# Patient Record
Sex: MALE | URBAN - METROPOLITAN AREA
[De-identification: names, ages, dates, MRNs, and addresses within clinical notes are randomized per-mention and may not be internally consistent; named-entity substitution may affect disease eponyms.]

---

## 2019-10-15 ENCOUNTER — OFFICE VISIT (OUTPATIENT)
Dept: URBAN - METROPOLITAN AREA CLINIC 22 | Facility: CLINIC | Age: 44
End: 2019-10-15
Payer: COMMERCIAL

## 2019-10-15 DIAGNOSIS — E11.9 TYPE 2 DIABETES MELLITUS W/O COMPLICATION: Primary | ICD-10-CM

## 2019-10-15 PROCEDURE — 92014 COMPRE OPH EXAM EST PT 1/>: CPT | Performed by: OPTOMETRIST

## 2019-10-15 ASSESSMENT — INTRAOCULAR PRESSURE
OD: 15
OS: 20

## 2019-10-15 NOTE — IMPRESSION/PLAN
Impression: Type 2 diabetes mellitus w/o complication: S57.4. OU. Plan: Recommend yearly diabetic eye exam. Discussed with patient importance of good blood sugar control with regular visits with PCP, compliance with medications, healthy diet and daily exercise.